# Patient Record
Sex: FEMALE | URBAN - METROPOLITAN AREA
[De-identification: names, ages, dates, MRNs, and addresses within clinical notes are randomized per-mention and may not be internally consistent; named-entity substitution may affect disease eponyms.]

---

## 2017-04-20 ENCOUNTER — APPOINTMENT (RX ONLY)
Dept: URBAN - METROPOLITAN AREA CLINIC 114 | Facility: CLINIC | Age: 37
Setting detail: DERMATOLOGY
End: 2017-04-20

## 2017-04-20 DIAGNOSIS — B88.0 OTHER ACARIASIS: ICD-10-CM

## 2017-04-20 DIAGNOSIS — L29.89 OTHER PRURITUS: ICD-10-CM

## 2017-04-20 PROBLEM — L29.8 OTHER PRURITUS: Status: ACTIVE | Noted: 2017-04-20

## 2017-04-20 PROBLEM — L85.3 XEROSIS CUTIS: Status: ACTIVE | Noted: 2017-04-20

## 2017-04-20 PROBLEM — L70.0 ACNE VULGARIS: Status: ACTIVE | Noted: 2017-04-20

## 2017-04-20 PROBLEM — L20.84 INTRINSIC (ALLERGIC) ECZEMA: Status: ACTIVE | Noted: 2017-04-20

## 2017-04-20 PROBLEM — F32.9 MAJOR DEPRESSIVE DISORDER, SINGLE EPISODE, UNSPECIFIED: Status: ACTIVE | Noted: 2017-04-20

## 2017-04-20 PROBLEM — F41.9 ANXIETY DISORDER, UNSPECIFIED: Status: ACTIVE | Noted: 2017-04-20

## 2017-04-20 PROCEDURE — ? TREATMENT REGIMEN

## 2017-04-20 PROCEDURE — 99203 OFFICE O/P NEW LOW 30 MIN: CPT

## 2017-04-20 PROCEDURE — ? COUNSELING

## 2017-04-20 PROCEDURE — ? PRESCRIPTION

## 2017-04-20 PROCEDURE — ? DIAGNOSIS COMMENT

## 2017-04-20 RX ORDER — PERMETHRIN 50 MG/G
CREAM TOPICAL
Qty: 2 | Refills: 1 | Status: ERX | COMMUNITY
Start: 2017-04-20

## 2017-04-20 RX ORDER — IVERMECTIN 3 MG/1
TABLET ORAL
Qty: 12 | Refills: 0 | Status: ERX | COMMUNITY
Start: 2017-04-20

## 2017-04-20 RX ORDER — IVERMECTIN 10 MG/G
CREAM TOPICAL
Qty: 1 | Refills: 1 | Status: ERX | COMMUNITY
Start: 2017-04-20

## 2017-04-20 RX ADMIN — PERMETHRIN: 50 CREAM TOPICAL at 13:37

## 2017-04-20 RX ADMIN — IVERMECTIN: 10 CREAM TOPICAL at 13:40

## 2017-04-20 RX ADMIN — IVERMECTIN: 3 TABLET ORAL at 13:37

## 2017-04-20 NOTE — PROCEDURE: TREATMENT REGIMEN
Plan: de-infestation of the home environment, clothings, bed linen and treatment of close contacts and pets was discussed to avoid re-infection.
Detail Level: Zone

## 2017-05-11 ENCOUNTER — APPOINTMENT (RX ONLY)
Dept: URBAN - METROPOLITAN AREA CLINIC 114 | Facility: CLINIC | Age: 37
Setting detail: DERMATOLOGY
End: 2017-05-11

## 2017-05-11 DIAGNOSIS — L81.4 OTHER MELANIN HYPERPIGMENTATION: ICD-10-CM

## 2017-05-11 DIAGNOSIS — L30.4 ERYTHEMA INTERTRIGO: ICD-10-CM

## 2017-05-11 DIAGNOSIS — B88.0 OTHER ACARIASIS: ICD-10-CM | Status: RESOLVED

## 2017-05-11 DIAGNOSIS — L29.89 OTHER PRURITUS: ICD-10-CM | Status: RESOLVED

## 2017-05-11 PROBLEM — L29.8 OTHER PRURITUS: Status: ACTIVE | Noted: 2017-05-11

## 2017-05-11 PROCEDURE — ? COUNSELING

## 2017-05-11 PROCEDURE — ? PRESCRIPTION

## 2017-05-11 PROCEDURE — 99214 OFFICE O/P EST MOD 30 MIN: CPT

## 2017-05-11 PROCEDURE — ? TREATMENT REGIMEN

## 2017-05-11 RX ORDER — HYDROQUINONE 4 %
CREAM (GRAM) TOPICAL
Qty: 1 | Refills: 1 | Status: ERX | COMMUNITY
Start: 2017-05-11

## 2017-05-11 RX ORDER — CICLOPIROX OLAMINE 7.7 MG/G
CREAM TOPICAL
Qty: 1 | Refills: 2 | Status: ERX | COMMUNITY
Start: 2017-05-11

## 2017-05-11 RX ADMIN — Medication: at 14:19

## 2017-05-11 RX ADMIN — CICLOPIROX OLAMINE: 7.7 CREAM TOPICAL at 14:19

## 2017-05-11 ASSESSMENT — LOCATION DETAILED DESCRIPTION DERM
LOCATION DETAILED: LEFT ANTERIOR PROXIMAL THIGH
LOCATION DETAILED: RIGHT ANTERIOR PROXIMAL THIGH

## 2017-05-11 ASSESSMENT — LOCATION ZONE DERM: LOCATION ZONE: LEG

## 2017-05-11 ASSESSMENT — LOCATION SIMPLE DESCRIPTION DERM
LOCATION SIMPLE: RIGHT THIGH
LOCATION SIMPLE: LEFT THIGH

## 2022-03-21 ENCOUNTER — APPOINTMENT (RX ONLY)
Dept: URBAN - METROPOLITAN AREA CLINIC 334 | Facility: CLINIC | Age: 42
Setting detail: DERMATOLOGY
End: 2022-03-21

## 2022-03-21 DIAGNOSIS — L98.8 OTHER SPECIFIED DISORDERS OF THE SKIN AND SUBCUTANEOUS TISSUE: ICD-10-CM

## 2022-03-21 DIAGNOSIS — Z41.9 ENCOUNTER FOR PROCEDURE FOR PURPOSES OTHER THAN REMEDYING HEALTH STATE, UNSPECIFIED: ICD-10-CM

## 2022-03-21 DIAGNOSIS — L68.0 HIRSUTISM: ICD-10-CM | Status: INADEQUATELY CONTROLLED

## 2022-03-21 PROCEDURE — ? ADDITIONAL NOTES

## 2022-03-21 PROCEDURE — ? COUNSELING

## 2022-03-21 PROCEDURE — ? PRESCRIPTION

## 2022-03-21 PROCEDURE — ? HYDRAFACIAL

## 2022-03-21 PROCEDURE — ? LASER HAIR REMOVAL

## 2022-03-21 RX ORDER — TRETINOIN 0.5 MG/G
GEL TOPICAL
Qty: 45 | Refills: 3 | Status: ERX | COMMUNITY
Start: 2022-03-21

## 2022-03-21 RX ADMIN — TRETINOIN: 0.5 GEL TOPICAL at 00:00

## 2022-03-21 ASSESSMENT — LOCATION SIMPLE DESCRIPTION DERM
LOCATION SIMPLE: LEFT CHEEK
LOCATION SIMPLE: LEFT BREAST

## 2022-03-21 ASSESSMENT — LOCATION ZONE DERM
LOCATION ZONE: TRUNK
LOCATION ZONE: FACE

## 2022-03-21 ASSESSMENT — LOCATION DETAILED DESCRIPTION DERM
LOCATION DETAILED: LEFT PERIAREOLAR BREAST 10-11:00 REGION
LOCATION DETAILED: LEFT CENTRAL MALAR CHEEK

## 2022-03-21 NOTE — PROCEDURE: HYDRAFACIAL
Price (Use Numbers Only, No Special Characters Or $): 453 Price (Use Numbers Only, No Special Characters Or $): 280

## 2022-03-21 NOTE — PROCEDURE: LASER HAIR REMOVAL
Detail Level: Detailed
Price (Use Numbers Only, No Special Characters Or $): 35.00
Eye Shield Text: Given the treatment area eye shields were inserted prior to treatment.
Were Eye Shields Employed?: No
Anesthesia Type: 1% lidocaine with epinephrine
External Cooling Fan Speed: 0
Pre-Procedure: Prior to proceeding the treatment areas were cleaned and all present put on their eye protection.
Post-Procedure Care: Immediate endpoint: perifollicular erythema and edema. Vaseline and ice applied. Post care reviewed with patient.
Laser Type: Alexandrite 755nm
Treatment Number: 14
Number Of Prepaid Treatments (Will Not Render If 0): 13
Spot Size: 15 mm
Fluence (Will Not Render If 0): 20
Consent: Written consent obtained, risks reviewed including but not limited to crusting, scabbing, blistering, scarring, darker or lighter pigmentary change, paradoxical hair regrowth, incomplete removal of hair and infection.
Post-Care Instructions: I reviewed with the patient in detail post-care instructions. Patient should avoid sun for a minimum of 4 weeks before and after treatment.

## 2024-02-26 ENCOUNTER — APPOINTMENT (RX ONLY)
Dept: URBAN - METROPOLITAN AREA CLINIC 334 | Facility: CLINIC | Age: 44
Setting detail: DERMATOLOGY
End: 2024-02-26

## 2024-02-26 DIAGNOSIS — L68.0 HIRSUTISM: ICD-10-CM

## 2024-02-26 PROCEDURE — ? LASER HAIR REMOVAL

## 2024-02-26 PROCEDURE — ? COUNSELING

## 2024-02-26 ASSESSMENT — LOCATION DETAILED DESCRIPTION DERM: LOCATION DETAILED: LEFT PERIAREOLAR BREAST 10-11:00 REGION

## 2024-02-26 ASSESSMENT — LOCATION SIMPLE DESCRIPTION DERM: LOCATION SIMPLE: LEFT BREAST

## 2024-02-26 ASSESSMENT — LOCATION ZONE DERM: LOCATION ZONE: TRUNK

## 2024-02-26 NOTE — PROCEDURE: LASER HAIR REMOVAL
Detail Level: Detailed
Price (Use Numbers Only, No Special Characters Or $): 35.00
Eye Shield Text: Given the treatment area eye shields were inserted prior to treatment.
Were Eye Shields Employed?: No
Anesthesia Type: 1% lidocaine with epinephrine
External Cooling Fan Speed: 0
Pre-Procedure: Prior to proceeding the treatment areas were cleaned and all present put on their eye protection.
Post-Procedure Care: Immediate endpoint: perifollicular erythema and edema. Vaseline and ice applied. Post care reviewed with patient.
Laser Type: Alexandrite 755nm
Treatment Number: 15
Number Of Prepaid Treatments (Will Not Render If 0): 13
Spot Size: 15 mm
Fluence (Will Not Render If 0): 20
Consent: Written consent obtained, risks reviewed including but not limited to crusting, scabbing, blistering, scarring, darker or lighter pigmentary change, paradoxical hair regrowth, incomplete removal of hair and infection.
Post-Care Instructions: I reviewed with the patient in detail post-care instructions. Patient should avoid sun for a minimum of 4 weeks before and after treatment.